# Patient Record
Sex: FEMALE | Race: WHITE | NOT HISPANIC OR LATINO | URBAN - METROPOLITAN AREA
[De-identification: names, ages, dates, MRNs, and addresses within clinical notes are randomized per-mention and may not be internally consistent; named-entity substitution may affect disease eponyms.]

---

## 2024-03-28 ENCOUNTER — OFFICE VISIT (OUTPATIENT)
Dept: OPHTHALMOLOGY | Facility: CLINIC | Age: 69
End: 2024-03-28
Payer: MEDICARE

## 2024-03-28 DIAGNOSIS — Z96.1 PSEUDOPHAKIA OF BOTH EYES: ICD-10-CM

## 2024-03-28 DIAGNOSIS — E11.9 CONTROLLED TYPE 2 DIABETES MELLITUS WITHOUT COMPLICATION, UNSPECIFIED WHETHER LONG TERM INSULIN USE (MULTI): Primary | ICD-10-CM

## 2024-03-28 PROCEDURE — 92014 COMPRE OPH EXAM EST PT 1/>: CPT | Performed by: OPHTHALMOLOGY

## 2024-03-28 RX ORDER — VALACYCLOVIR HYDROCHLORIDE 1 G/1
TABLET, FILM COATED ORAL EVERY 24 HOURS
COMMUNITY
Start: 2023-10-18

## 2024-03-28 RX ORDER — LOSARTAN POTASSIUM 50 MG/1
TABLET ORAL
COMMUNITY
Start: 2006-05-09

## 2024-03-28 RX ORDER — ASPIRIN 81 MG/1
TABLET ORAL
COMMUNITY
Start: 2008-04-08

## 2024-03-28 RX ORDER — LEVOTHYROXINE SODIUM 112 UG/1
TABLET ORAL EVERY 24 HOURS
COMMUNITY

## 2024-03-28 RX ORDER — METFORMIN HYDROCHLORIDE 1000 MG/1
1000 TABLET ORAL 2 TIMES DAILY
COMMUNITY

## 2024-03-28 RX ORDER — FLUOXETINE 10 MG/1
CAPSULE ORAL EVERY 24 HOURS
COMMUNITY
Start: 2017-06-13

## 2024-03-28 RX ORDER — BENZONATATE 100 MG/1
CAPSULE ORAL
COMMUNITY
Start: 2024-02-21

## 2024-03-28 RX ORDER — CEPHALEXIN 500 MG/1
500 CAPSULE ORAL 4 TIMES DAILY
COMMUNITY
Start: 2024-03-13 | End: 2024-03-20

## 2024-03-28 RX ORDER — LOVASTATIN 40 MG/1
TABLET ORAL EVERY 24 HOURS
COMMUNITY
Start: 2014-06-21

## 2024-03-28 RX ORDER — EMPAGLIFLOZIN 10 MG/1
10 TABLET, FILM COATED ORAL DAILY
COMMUNITY

## 2024-03-28 RX ORDER — BLOOD-GLUCOSE METER
EACH MISCELLANEOUS
COMMUNITY

## 2024-03-28 RX ORDER — FAMOTIDINE 20 MG/1
TABLET, FILM COATED ORAL
COMMUNITY
Start: 2007-04-04

## 2024-03-28 ASSESSMENT — REFRACTION_CURRENTRX
OS_SPHERE: +2.50
OS_BASECURVE: 8.6
OS_BRAND: CLARITI 1 DAY
OS_DIAMETER: 14.1

## 2024-03-28 ASSESSMENT — REFRACTION_MANIFEST
OS_AXIS: 145
OS_ADD: +2.50
OD_SPHERE: PLANO
OD_CYLINDER: SPHERE
OS_SPHERE: +0.75
OD_ADD: +2.50
OS_CYLINDER: -1.00

## 2024-03-28 ASSESSMENT — ENCOUNTER SYMPTOMS
MUSCULOSKELETAL NEGATIVE: 0
RESPIRATORY NEGATIVE: 0
ALLERGIC/IMMUNOLOGIC NEGATIVE: 0
CONSTITUTIONAL NEGATIVE: 0
PSYCHIATRIC NEGATIVE: 0
GASTROINTESTINAL NEGATIVE: 0
NEUROLOGICAL NEGATIVE: 0
HEMATOLOGIC/LYMPHATIC NEGATIVE: 0
EYES NEGATIVE: 0
CARDIOVASCULAR NEGATIVE: 0
ENDOCRINE NEGATIVE: 0

## 2024-03-28 ASSESSMENT — CONF VISUAL FIELD
OS_SUPERIOR_NASAL_RESTRICTION: 0
OD_SUPERIOR_TEMPORAL_RESTRICTION: 0
OS_SUPERIOR_TEMPORAL_RESTRICTION: 0
OD_INFERIOR_TEMPORAL_RESTRICTION: 0
OD_SUPERIOR_NASAL_RESTRICTION: 0
OS_INFERIOR_NASAL_RESTRICTION: 0
OS_INFERIOR_TEMPORAL_RESTRICTION: 0
OS_NORMAL: 1
OD_NORMAL: 1
OD_INFERIOR_NASAL_RESTRICTION: 0

## 2024-03-28 ASSESSMENT — TONOMETRY
IOP_METHOD: GOLDMANN APPLANATION
OS_IOP_MMHG: 16
OD_IOP_MMHG: 15

## 2024-03-28 ASSESSMENT — CUP TO DISC RATIO
OD_RATIO: .3
OS_RATIO: .4

## 2024-03-28 ASSESSMENT — VISUAL ACUITY
METHOD: SNELLEN - LINEAR
OS_SC: 20/20
OD_SC: 20/20
OS_SC+: -1

## 2024-03-28 ASSESSMENT — EXTERNAL EXAM - LEFT EYE: OS_EXAM: NORMAL

## 2024-03-28 ASSESSMENT — EXTERNAL EXAM - RIGHT EYE: OD_EXAM: NORMAL

## 2024-03-28 ASSESSMENT — SLIT LAMP EXAM - LIDS
COMMENTS: GOOD POSITION
COMMENTS: GOOD POSITION

## 2024-03-28 NOTE — PROGRESS NOTES
Assessment/Plan   Diagnoses and all orders for this visit:  Controlled type 2 diabetes mellitus without complication, unspecified whether long term insulin use (CMS/[facility])  no retinopathy observed on exam today od/os, pt ed to continue good BGlc, blood pressure and lipid control, rtc with any changes in vision, otherwise monitor 1 year   Pseudophakia of both eyes  CL prescription given to patient today   +2.50 OS for near  Resolved chalazion, rec [facility]

## 2024-08-14 ENCOUNTER — APPOINTMENT (OUTPATIENT)
Dept: OCCUPATIONAL THERAPY | Facility: CLINIC | Age: 69
End: 2024-08-14
Payer: MEDICARE

## 2024-08-21 ENCOUNTER — HOSPITAL ENCOUNTER (OUTPATIENT)
Dept: RADIOLOGY | Facility: CLINIC | Age: 69
Discharge: HOME | End: 2024-08-21
Payer: MEDICARE

## 2024-08-21 ENCOUNTER — EVALUATION (OUTPATIENT)
Dept: PHYSICAL THERAPY | Facility: CLINIC | Age: 69
End: 2024-08-21
Payer: MEDICARE

## 2024-08-21 DIAGNOSIS — M79.601 RIGHT ARM PAIN: Primary | ICD-10-CM

## 2024-08-21 DIAGNOSIS — M54.12 RADICULOPATHY, CERVICAL REGION: ICD-10-CM

## 2024-08-21 PROCEDURE — 72050 X-RAY EXAM NECK SPINE 4/5VWS: CPT

## 2024-08-21 PROCEDURE — 97162 PT EVAL MOD COMPLEX 30 MIN: CPT | Mod: GP

## 2024-08-21 ASSESSMENT — PAIN SCALES - GENERAL
PAINLEVEL_OUTOF10: 7
PAINLEVEL_OUTOF10: 6

## 2024-08-21 ASSESSMENT — ENCOUNTER SYMPTOMS
DEPRESSION: 0
OCCASIONAL FEELINGS OF UNSTEADINESS: 0
LOSS OF SENSATION IN FEET: 0

## 2024-08-21 ASSESSMENT — PATIENT HEALTH QUESTIONNAIRE - PHQ9
SUM OF ALL RESPONSES TO PHQ9 QUESTIONS 1 AND 2: 0
2. FEELING DOWN, DEPRESSED OR HOPELESS: NOT AT ALL
1. LITTLE INTEREST OR PLEASURE IN DOING THINGS: NOT AT ALL

## 2024-08-21 ASSESSMENT — PAIN - FUNCTIONAL ASSESSMENT: PAIN_FUNCTIONAL_ASSESSMENT: 0-10

## 2024-08-21 NOTE — LETTER
August 21, 2024    Goyo Anderson MD  [address] [address]    Patient: Sarahi Arreguin   YOB: 1955   Date of Visit: 8/21/2024       Dear Goyo Anderson MD  [address] [address]    The attached plan of care is being sent to you because your patient’s medical reimbursement requires that you certify the plan of care. Your signature is required to allow uninterrupted insurance coverage.      You may indicate your approval by signing below and faxing this form back to us at Dept Fax: [phone].    Please call Dept: [phone] with any questions or concerns.    Thank you for this referral,        Colleen Magana, PT  POR [address] [facility] 14  [facility]  [address] [address]    Payer: Payor: [employer] MEDICARE / Plan: [employer] / Product Type: *No Product type* /                                                                         Date:     Dear Colleen Magana, PT,     Re: Ms. Sarahi Arreguin, MRN:[id number]    I certify that I have reviewed the attached plan of care and it is medically necessary for Ms. Sarahi Arreguin (1955) who is under my care.          ______________________________________                    _________________  Provider name and credentials                                           Date and time                                                                                           Plan of Care 8/21/24   Effective from: 8/21/2024  Effective to: 11/19/2024    Plan ID: [id number]            Participants as of Finalize on 8/21/2024    Name Type Comments Contact Info    Goyo Anderson MD PCP - General  [phone]       Last Plan Note     Author: Colleen Magana, PT Status: Incomplete Last edited: 8/21/2024  8:00 AM       Physical Therapy    Physical Therapy Evaluation    Patient Name: Sarahi Arreguin  MRN: [id number]  Today's  Date: 8/21/2024    Time Entry:  Time Calculation  Start Time: 0800  Stop Time: 0845  Time Calculation (min): 45 min  PT Evaluation Time Entry  PT Evaluation (Moderate) Time Entry: 45                      Assessment  PT Assessment Results: Decreased strength, Pain, Decreased range of motion  Rehab Prognosis: Good  Barriers to Participation:  ($40 per visit copay)  Assessment Comment: Signs and symptoms are more consistent with possible cervical radiculopathy vs isolated right shoulder and elbow isses as right UE symptoms can be provoked with cervical flexion, cervical retraction, and with quadrant testing. Mild relief of distal symptoms with manual distraction and with postural corrections achieved today; however, her symptoms are easily provoked. We discussed having her review this information with her PCP today and discuss options. We can address her deficits with skilled PT and work from a premise of radiculopathy (cervical derangement, with no clear directional preference determined today) to see if we can help Sarahi achieve consistent symptom relief; ongoing self management and return to PLOF.    Plan  Treatment/Interventions: Education/ Instruction, Hot pack, Manual therapy, Therapeutic activities, Therapeutic exercises  PT Plan: Skilled PT  PT Frequency: 1 time per week  Duration: 6 weeks; pending progress  Onset Date: 07/01/24  Certification Period Start Date: 08/21/24  Certification Period End Date: 11/19/24  Number of Treatments Authorized: No prior auth required  Rehab Potential: Good  Plan of Care Agreement: Patient    Current Problem  1. Right arm pain  Referral to Physical Therapy    Follow Up In Physical Therapy          Subjective  General:  General  Reason for Referral: Right shoulder and elbow pain. (upper trap strain, lateral epicondylitis)  Referred By: Dr Goyo Anderson  Past Medical History Relevant to Rehab: Reviewed in Epic and on on rehab intake form. Remote hx of frozen shoulders  R/L.  General Comment: Reports right side shoulder; arm pit; scapular and forearm pain that started early July. She feels that posibly picking up her grandson who is now 25lbs.  The pain is constant. She will occasionally get tingling down her arm and into her hand. She saw her PCP who prescribed naproxen and PT. She has a follow up with him later this am.  She denies neck pain currently, but has a history of neck pain. She tried naproxen 2 times/ day for 10 days without relief. Tylenol and ibuprofen gives minimal relief. She notes that sitting upright provides some relief of her symptoms.  Precautions:  Precautions  STEADI Fall Risk Score (The score of 4 or more indicates an increased risk of falling): 0  Precautions Comment: None     Pain:  Pain Assessment: 0-10  0-10 (Numeric) Pain Score: 7  Pain Location: Arm  Pain Orientation: Right  Pain Radiating Towards: forearm, hand (tingling at times)  Home Living:  Home Living Comment: Lives with sig other in single story; no stairs.  Has modified some dailly activities to minimize her pain.  Prior Function Per Pt/Caregiver Report:  Vocational: Retired (Retired RN)  Leisure: Painting, reading, watches grandson 1 day/ week  Hand Dominance: Right  Prior Function Comments: Independent, active    Objective  Shoulder      Shoulder palpation/Joint Assessment     Mild tenderness along right medial scap border; right supraspinous fossa.   Baseline symptoms:  Right med scap border; body of scapula and in wrist ext mm belly pain/ soreness  Cervical AROM  Cervical flexion: (80°): min decreased and triggers right UE tingling  Cervical extension: (50°): min decreased, no change in tingling  Cervical Rotation: (80°): 60 deg  Cervical rotation left: (80°): 65 deg  Cervical sidebend right: (45°): 35 and increased scap pain  Cervical sidebend left: (45°): 30 deg    Right UE Symptoms (to scap to wrist) produced with cervical flexion, neck retractions (repeated neck retractions); and with  right quadrant test.   Trial of manual cervical distraction: Right UE symptoms decreased (to above elbow)  with improved posture (less slouching, less forward head), initially decreased with manual cervical distraction but then increased after manual distraction was stopped.     Shoulder AROM  Shoulder AROM WFL: yes    Shoulder Strength   Grossly 4+/5    Bilateral scalene and upper trap tightness right more than left     and Elbow     Elbow Palpation/Joint Mobility Assessment     Tender over right lateral epicondyle and mm belly of wrist extensors  Elbow AROM  Elbow AROM WFL: yes     Elbow Strength   Wrist ext: 4+ R ;  4+ L  Wrist flex: 5 R ; 5 L     Strength   R  strength: 48lb average of 2 trials (no pain at forearm or lateral elbow)  L  strength: 58 lb average of 2 trials    Elbow Special Tests      Negative phalen's  Negative pain with resisted wrist extension    Outcome Measure:  Quick Dash: 38 (raw score)    OP EDUCATION:  Outpatient Education  Individual(s) Educated: Patient  Education Provided: Anatomy, Body Mechanics, POC  Risk and Benefits Discussed with Patient/Caregiver/Other: yes  Patient/Caregiver Demonstrated Understanding: yes  Plan of Care Discussed and Agreed Upon: yes  Patient Response to Education: Patient/Caregiver Verbalized Understanding of Information, Patient/Caregiver Performed Return Demonstration of Exercises/Activities, Patient/Caregiver Asked Appropriate Questions  Discussed possible differential diagnosis of cervical radiculopathy and to discuss this with her PCP; and how we would likely approach this with PT.  Discussed postural modifications to avoid neck flexion which is a trigger for her arm symptoms currently.     Goals:  Patient Goal: Would like pain relief.    Physical Therapy:  Pain: Will be independent with symptom management strategies and report right UE pain centralized to neck/ scapula and no worse than 2/10.  Strength: Right UE strength;  strength equal to  uninvolved side for return to full PLOF with all ADLs, IADLs, recreational pursuits.  Function: QuickDash <= 15 for improved use of UE with all daily activities.  HEP/ Self Management:  Will be independent and compliant with appropriate HEP for carryover of PT for consistent right UE symptom management, posture and body mechanics modification for symptom management and return to PLOF.          Current Participants as of 8/21/2024    Name Type Comments Contact Info    Goyo Anderson MD PCP - General  [phone]    Signature pending

## 2024-08-21 NOTE — PROGRESS NOTES
Physical Therapy    Physical Therapy Evaluation    Patient Name: Sarahi Arreguin  MRN: [id number]  Today's Date: 8/21/2024    Time Entry:  Time Calculation  Start Time: 0800  Stop Time: 0845  Time Calculation (min): 45 min  PT Evaluation Time Entry  PT Evaluation (Moderate) Time Entry: 45                      Assessment  PT Assessment Results: Decreased strength, Pain, Decreased range of motion  Rehab Prognosis: Good  Barriers to Participation:  ($40 per visit copay)  Assessment Comment: Signs and symptoms are more consistent with possible cervical radiculopathy vs isolated right shoulder and elbow isses as right UE symptoms can be provoked with cervical flexion, cervical retraction, and with quadrant testing. Mild relief of distal symptoms with manual distraction and with postural corrections achieved today; however, her symptoms are easily provoked. We discussed having her review this information with her PCP today and discuss options. We can address her deficits with skilled PT and work from a premise of radiculopathy (cervical derangement, with no clear directional preference determined today) to see if we can help Sarahi achieve consistent symptom relief; ongoing self management and return to PLOF.    Plan  Treatment/Interventions: Education/ Instruction, Hot pack, Manual therapy, Therapeutic activities, Therapeutic exercises  PT Plan: Skilled PT  PT Frequency: 1 time per week  Duration: 6 weeks; pending progress  Onset Date: 07/01/24  Certification Period Start Date: 08/21/24  Certification Period End Date: 11/19/24  Number of Treatments Authorized: No prior auth required  Rehab Potential: Good  Plan of Care Agreement: Patient    Current Problem  1. Right arm pain  Referral to Physical Therapy    Follow Up In Physical Therapy          Subjective   General:  General  Reason for Referral: Right shoulder and elbow pain. (upper trap strain, lateral epicondylitis)  Referred By: Dr Goyo Anderson  Past Medical  History Relevant to Rehab: Reviewed in Epic and on on rehab intake form. Remote hx of frozen shoulders R/L.  General Comment: Reports right side shoulder; arm pit; scapular and forearm pain that started early July. She feels that posibly picking up her grandson who is now 25lbs.  The pain is constant. She will occasionally get tingling down her arm and into her hand. She saw her PCP who prescribed naproxen and PT. She has a follow up with him later this am.  She denies neck pain currently, but has a history of neck pain. She tried naproxen 2 times/ day for 10 days without relief. Tylenol and ibuprofen gives minimal relief. She notes that sitting upright provides some relief of her symptoms.  Precautions:  Precautions  STEADI Fall Risk Score (The score of 4 or more indicates an increased risk of falling): 0  Precautions Comment: None     Pain:  Pain Assessment: 0-10  0-10 (Numeric) Pain Score: 7  Pain Location: Arm  Pain Orientation: Right  Pain Radiating Towards: forearm, hand (tingling at times)  Home Living:  Home Living Comment: Lives with sig other in single story; no stairs.  Has modified some dailly activities to minimize her pain.  Prior Function Per Pt/Caregiver Report:  Vocational: Retired (Retired RN)  Leisure: Painting, reading, watches grandson 1 day/ week  Hand Dominance: Right  Prior Function Comments: Independent, active    Objective   Shoulder      Shoulder palpation/Joint Assessment     Mild tenderness along right medial scap border; right supraspinous fossa.   Baseline symptoms:  Right med scap border; body of scapula and in wrist ext mm belly pain/ soreness  Cervical AROM  Cervical flexion: (80°): min decreased and triggers right UE tingling  Cervical extension: (50°): min decreased, no change in tingling  Cervical Rotation: (80°): 60 deg  Cervical rotation left: (80°): 65 deg  Cervical sidebend right: (45°): 35 and increased scap pain  Cervical sidebend left: (45°): 30 deg    Right UE Symptoms  (to scap to wrist) produced with cervical flexion, neck retractions (repeated neck retractions); and with right quadrant test.   Trial of manual cervical distraction: Right UE symptoms decreased (to above elbow)  with improved posture (less slouching, less forward head), initially decreased with manual cervical distraction but then increased after manual distraction was stopped.     Shoulder AROM  Shoulder AROM WFL: yes    Shoulder Strength   Grossly 4+/5    Bilateral scalene and upper trap tightness right more than left     and Elbow     Elbow Palpation/Joint Mobility Assessment     Tender over right lateral epicondyle and mm belly of wrist extensors  Elbow AROM  Elbow AROM WFL: yes     Elbow Strength   Wrist ext: 4+ R ;  4+ L  Wrist flex: 5 R ; 5 L     Strength   R  strength: 48lb average of 2 trials (no pain at forearm or lateral elbow)  L  strength: 58 lb average of 2 trials    Elbow Special Tests      Negative phalen's  Negative pain with resisted wrist extension    Outcome Measure:  Quick Dash: 38 (raw score)    OP EDUCATION:  Outpatient Education  Individual(s) Educated: Patient  Education Provided: Anatomy, Body Mechanics, POC  Risk and Benefits Discussed with Patient/Caregiver/Other: yes  Patient/Caregiver Demonstrated Understanding: yes  Plan of Care Discussed and Agreed Upon: yes  Patient Response to Education: Patient/Caregiver Verbalized Understanding of Information, Patient/Caregiver Performed Return Demonstration of Exercises/Activities, Patient/Caregiver Asked Appropriate Questions  Discussed possible differential diagnosis of cervical radiculopathy and to discuss this with her PCP; and how we would likely approach this with PT.  Discussed postural modifications to avoid neck flexion which is a trigger for her arm symptoms currently.     Goals:  Patient Goal: Would like pain relief.    Physical Therapy:  Pain: Will be independent with symptom management strategies and report right UE pain  centralized to neck/ scapula and no worse than 2/10.  Strength: Right UE strength;  strength equal to uninvolved side for return to full PLOF with all ADLs, IADLs, recreational pursuits.  Function: QuickDash <= 15 for improved use of UE with all daily activities.  HEP/ Self Management:  Will be independent and compliant with appropriate HEP for carryover of PT for consistent right UE symptom management, posture and body mechanics modification for symptom management and return to PLOF.

## 2024-08-23 ENCOUNTER — APPOINTMENT (OUTPATIENT)
Facility: CLINIC | Age: 69
End: 2024-08-23
Payer: MEDICARE

## 2024-08-30 ENCOUNTER — TREATMENT (OUTPATIENT)
Dept: PHYSICAL THERAPY | Facility: CLINIC | Age: 69
End: 2024-08-30
Payer: MEDICARE

## 2024-08-30 DIAGNOSIS — M79.601 RIGHT ARM PAIN: ICD-10-CM

## 2024-08-30 PROCEDURE — 97530 THERAPEUTIC ACTIVITIES: CPT | Mod: GP

## 2024-08-30 PROCEDURE — 97110 THERAPEUTIC EXERCISES: CPT | Mod: GP

## 2024-08-30 PROCEDURE — 97140 MANUAL THERAPY 1/> REGIONS: CPT | Mod: GP

## 2024-08-30 ASSESSMENT — PAIN - FUNCTIONAL ASSESSMENT: PAIN_FUNCTIONAL_ASSESSMENT: 0-10

## 2024-08-30 ASSESSMENT — PAIN SCALES - GENERAL
PAINLEVEL_OUTOF10: 4
PAINLEVEL_OUTOF10: 4

## 2024-08-30 NOTE — PROGRESS NOTES
Physical Therapy    Physical Therapy Treatment    Patient Name: Sarahi Arreguin  MRN: [id number]  Today's Date: 8/30/2024    Time Entry:   Time Calculation  Start Time: 0805  Stop Time: 0855  Time Calculation (min): 50 min     PT Therapeutic Procedures Time Entry  Manual Therapy Time Entry: 12  Therapeutic Exercise Time Entry: 8  Therapeutic Activity Time Entry: 20              Non-Billable Time  Non-billable time: 10  Non-billable time reason: reviewed xrays and Dr follow up since first visit    Assessment:   Right UE syptoms have subsided since being on a steroid taper. Had an xray which does show degenerative changes in c spine and will see a spine specialist and is awaiting an MRI. Tolerated manual cervical distraction today without provocation of UE symptoms. Cervical flexion and neck retraction still trigger faint right forearm symptoms.  We discussed and focused a lot on postural awareness and modfications with all daily activities to eliminate possible triggers of her right UE symptoms.     Plan:   Assess response to today's treatment. Continue cervical traction if indicated and progress postural exercises.     Current Problem  1. Right arm pain  Follow Up In Physical Therapy          General     General  General Comment: Having some tingling into right arm still, and into right shoulder blade as well as some mild neck discomfort. The symptoms are intermittent now and since beginning a steroid taper almost 10 days ago. taking motrin/ tylenol as well. Neck flexion will still trigger her symptoms. Had a neck xray which does show degenerative changes at C5. Has been referred to a spine specialist and an MRI has been requested.    Subjective    Precautions  Precautions  STEADI Fall Risk Score (The score of 4 or more indicates an increased risk of falling): 0  Precautions Comment: None    Pain  Pain Assessment  Pain Assessment: 0-10  0-10 (Numeric) Pain Score: 4  Pain Location: Arm  Pain Orientation:  "Right  Pain Radiating Towards: forearm  Pain 2  Pain Score 2: 4  Pain Location 2: Shoulder  Pain Orientation 2: Right  Pain Radiating Towards 2: scapula    Objective   Treatments:  Reviewed recent xrays.    There Activity:  20 min  Baseline symptoms today- mild sensitivity of right forearm  Chin tuck increases the right forearm tingling  Cervical flexion and extension increase tingling  Mild axillary discomfort with median nerve ULTT  Neg radial and ulnar ULTT  Reviewed and discussed body mechanics modifications to avoid neck flexion, neck protraction forward head, neck ext with all daily activities including reading, painting/ drawing, walking.   Practiced more upright posture.    There Ex: 8 min  Retro shoulder rolls  Scap retractions  \"No money with yellow bands\" x 20  Reviewed and updated HEP    Manual therapy:  12 minutes of intermittent cervical distraction in neutral spine positions      Access Code: [email or web address]  URL: [email or web address]  Date: 08/30/2024  Prepared by: Colleen Magana    Exercises  - Standing Backward Shoulder Rolls  - 2 x daily - 7 x weekly - 20 reps  - Shoulder External Rotation and Scapular Retraction with Resistance  - 2 x daily - 7 x weekly - 20 reps    Goals:  Patient Goal: Would like pain relief.    Physical Therapy:  Pain: Will be independent with symptom management strategies and report right UE pain centralized to neck/ scapula and no worse than 2/10.  Strength: Right UE strength;  strength equal to uninvolved side for return to full PLOF with all ADLs, IADLs, recreational pursuits.  Function: QuickDash <= 15 for improved use of UE with all daily activities.  HEP/ Self Management:  Will be independent and compliant with appropriate HEP for carryover of PT for consistent right UE symptom management, posture and body mechanics modification for symptom management and return to PLOF.   "

## 2024-09-04 ENCOUNTER — HOSPITAL ENCOUNTER (OUTPATIENT)
Dept: RADIOLOGY | Facility: CLINIC | Age: 69
Discharge: HOME | End: 2024-09-04
Payer: MEDICARE

## 2024-09-04 VITALS — WEIGHT: 139 LBS | HEIGHT: 64 IN | BODY MASS INDEX: 23.73 KG/M2

## 2024-09-04 DIAGNOSIS — Z12.31 ENCOUNTER FOR SCREENING MAMMOGRAM FOR MALIGNANT NEOPLASM OF BREAST: ICD-10-CM

## 2024-09-04 PROCEDURE — 77063 BREAST TOMOSYNTHESIS BI: CPT | Performed by: RADIOLOGY

## 2024-09-04 PROCEDURE — 77067 SCR MAMMO BI INCL CAD: CPT | Performed by: RADIOLOGY

## 2024-09-04 PROCEDURE — 77067 SCR MAMMO BI INCL CAD: CPT

## 2024-09-06 ENCOUNTER — TREATMENT (OUTPATIENT)
Dept: PHYSICAL THERAPY | Facility: CLINIC | Age: 69
End: 2024-09-06
Payer: MEDICARE

## 2024-09-06 DIAGNOSIS — M79.601 RIGHT ARM PAIN: ICD-10-CM

## 2024-09-06 PROCEDURE — 97012 MECHANICAL TRACTION THERAPY: CPT | Mod: GP

## 2024-09-06 PROCEDURE — 97110 THERAPEUTIC EXERCISES: CPT | Mod: GP

## 2024-09-06 ASSESSMENT — PAIN - FUNCTIONAL ASSESSMENT: PAIN_FUNCTIONAL_ASSESSMENT: 0-10

## 2024-09-06 ASSESSMENT — PAIN SCALES - GENERAL: PAINLEVEL_OUTOF10: 3

## 2024-09-06 NOTE — PROGRESS NOTES
"Physical Therapy    Physical Therapy Treatment    Patient Name: Sarahi Arreguin  MRN: [id number]  Today's Date: 9/6/2024    Time Entry:   Time Calculation  Start Time: 0803  Stop Time: 0845  Time Calculation (min): 42 min     PT Therapeutic Procedures Time Entry  Therapeutic Exercise Time Entry: 23  PT Modalities Time Entry  Mechanical Traction Time Entry: 15                Assessment:   Decreased intensity of right UE symptoms; able to decrease tingling when she does get it by changing her head position. Tolerated first session of mechanical cervical traction well. No   Plan:    Assess response to mechanical traction and additional postural strengthening ex.     Current Problem  1. Right arm pain  Follow Up In Physical Therapy          General     General  General Comment: Completed her steroid dose pack this past Sunday. [place] like her symptoms were better on them; but she is still noticing less right UE tingling because she is watching her head position. If her right UE tingles, she can reduce it with changing her head position (staying in a neutral position). Still having mostly forearm dull ache, intermittent tingling.    Subjective    Precautions  Precautions  STEADI Fall Risk Score (The score of 4 or more indicates an increased risk of falling): 0  Precautions Comment: none    Pain  Pain Assessment  Pain Assessment: 0-10  0-10 (Numeric) Pain Score: 3  Pain Location: Arm  Pain Orientation: Right  Pain Radiating Towards: forearm, top of shoulder    Objective   Treatments:  Reviewed and discussed body mechanics modifications to avoid neck flexion, neck protraction forward head, neck ext with all daily activities including reading, painting/ drawing, walking.   Practiced more upright posture.    There Ex: 23 min  Mid rows- green bands  x 20  Bilat shoulder ext (pulldowns)- green bands x 20  \"No money with yellow bands\" x 20  Shoulder opposite diagonal x 10 R/L yellow  Gentle doorway stretch  3 x 20 sec  Reviewed " and updated HEP    Mechanical Cervical Traction x 15 min  Intermittent:  40 sec hold/ 10 sec rest;   15# max/ 10# min x 15 total    Access Code: [email or web address]  URL: [email or web address]  Date: 09/06/2024  Prepared by: Colleen Magana    Exercises  - Standing Bilateral Low Shoulder Row with Anchored Resistance  - 1 x daily - 3 x weekly - 2-3 sets - 10 reps  - Shoulder Extension with Resistance  - 1 x daily - 3 x weekly - 2-3 sets - 10 reps  - Standing Shoulder Diagonal Horizontal Abduction 60/120 Degrees with Resistance  - 1 x daily - 3 x weekly - 2-3 sets - 10 reps  Goals:  Patient Goal: Would like pain relief.    Physical Therapy:  Pain: Will be independent with symptom management strategies and report right UE pain centralized to neck/ scapula and no worse than 2/10.  Strength: Right UE strength;  strength equal to uninvolved side for return to full PLOF with all ADLs, IADLs, recreational pursuits.  Function: QuickDash <= 15 for improved use of UE with all daily activities.  HEP/ Self Management:  Will be independent and compliant with appropriate HEP for carryover of PT for consistent right UE symptom management, posture and body mechanics modification for symptom management and return to PLOF.

## 2024-09-11 ENCOUNTER — TREATMENT (OUTPATIENT)
Dept: PHYSICAL THERAPY | Facility: CLINIC | Age: 69
End: 2024-09-11
Payer: MEDICARE

## 2024-09-11 DIAGNOSIS — M79.601 RIGHT ARM PAIN: ICD-10-CM

## 2024-09-11 PROCEDURE — 97110 THERAPEUTIC EXERCISES: CPT | Mod: GP

## 2024-09-11 ASSESSMENT — PAIN SCALES - GENERAL
PAINLEVEL_OUTOF10: 5 - MODERATE PAIN
PAINLEVEL_OUTOF10: 5 - MODERATE PAIN

## 2024-09-11 ASSESSMENT — PAIN - FUNCTIONAL ASSESSMENT: PAIN_FUNCTIONAL_ASSESSMENT: 0-10

## 2024-09-11 NOTE — PROGRESS NOTES
Physical Therapy    Physical Therapy Treatment    Patient Name: Sarahi Arreguin  MRN: [id number]  Today's Date: 9/11/2024    Time Entry:   Time Calculation  Start Time: 1020  Stop Time: 1100  Time Calculation (min): 40 min     PT Therapeutic Procedures Time Entry  Therapeutic Exercise Time Entry: 38                   Assessment:    Sarahi has attended 4 PT sessions to address right UE pain/ cervical radiculopathy. Mechanical traction felt good while on it last time, but she experienced increased right UE symptoms for a good day after. Attempts at manual distraction produced forearm symptoms today. She is not getting any significant relief of her symptoms at this time; but can somewhat control the intensity of symptoms with avoiding certain neck positions (right lat flexion and rotation; and cervical flexion). She was denied an MRI by her insurance until she completes 6 sessions of PT. The initial relief from the recent medrol dosepac, may be diminishing,. We did add nerve flossing today to see if that will help further control her right UE symptoms.     Plan:   Assess response to nerve flossing; postural ex. Recheck next visit.    Current Problem  1. Right arm pain  Follow Up In Physical Therapy          General     General  General Comment: Right arm is hurting almost always and she is having hypersensitivity. Top of right shoulder blade is hurting pretty constantly. Was pretty sore after last session. Unsure if traction helped last time; but feels it didn't necessarily hurt.   Has been trying to be more consistent wtih medication.; and may have 2-3 hours of relief at most. Completed medrol dose pac about two weeks ago; and symptoms have slowly increased.    Subjective    Precautions  Precautions  STEADI Fall Risk Score (The score of 4 or more indicates an increased risk of falling): 0  Precautions Comment: none  Vital Signs     Pain  Pain Assessment  Pain Assessment: 0-10  0-10 (Numeric) Pain Score: 5 -  "Moderate pain  Pain Location: Arm  Pain Orientation: Right  Pain Radiating Towards: forearm  Pain 2  Pain Score 2: 5 - Moderate pain  Pain Location 2: Shoulder  Pain Orientation 2: Right  Pain Radiating Towards 2: scapula    Objective    Right cervical rotation; lat flexion and forward flexion still provoke right arm symptoms.   Right  : 40# (position 2 );   Left : 45#  Positive ULTT radial and ulnar nerve. (Radial n more reactive than ulnar)    Treatments:  There ex 38 min:   Reviewed and discussed body mechanics modifications to avoid neck flexion, neck protraction forward head, neck ext with all daily activities including reading, painting/ drawing, walking.   Practiced more upright posture.    Recheck movement testing; ULTT  Cervical retractions: 10 x 5 sec (limited range); provoked right forearm symptoms, so stopped   Mid rows- green bands  x 20  Bilat shoulder ext (pulldowns)- green bands x 20  \"No money with yellow bands\" x 20  Shoulder opposite diagonal x 10 R/L yellow- hold today  Gentle doorway stretch  3 x 20 sec  Radial nerve flossing performed in standing and head to neutral on return x 20 reps  Reviewed and updated HEP    Manual cervical distraction:  not tolerated today due to increased distal arm tingling    Access Code: [email or web address]  URL: [email or web address]  Date: 09/11/2024  Prepared by: Colleen Magana    Exercises  - Radial Nerve Flossing (Mirrored)  - 2 x daily - 7 x weekly - 20 reps    Goals:  Patient Goal: Would like pain relief.    Physical Therapy:  Pain: Will be independent with symptom management strategies and report right UE pain centralized to neck/ scapula and no worse than 2/10.  Strength: Right UE strength;  strength equal to uninvolved side for return to full PLOF with all ADLs, IADLs, recreational pursuits.  Function: QuickDash <= 15 for improved use of UE with all daily activities.  HEP/ Self Management:  Will be independent and compliant with " appropriate HEP for carryover of PT for consistent right UE symptom management, posture and body mechanics modification for symptom management and return to PLOF.      1

## 2024-09-20 ENCOUNTER — TREATMENT (OUTPATIENT)
Dept: PHYSICAL THERAPY | Facility: CLINIC | Age: 69
End: 2024-09-20
Payer: MEDICARE

## 2024-09-20 DIAGNOSIS — M79.601 RIGHT ARM PAIN: ICD-10-CM

## 2024-09-20 PROCEDURE — 97140 MANUAL THERAPY 1/> REGIONS: CPT | Mod: GP

## 2024-09-20 PROCEDURE — 97530 THERAPEUTIC ACTIVITIES: CPT | Mod: GP

## 2024-09-20 ASSESSMENT — PAIN - FUNCTIONAL ASSESSMENT: PAIN_FUNCTIONAL_ASSESSMENT: 0-10

## 2024-09-20 ASSESSMENT — PAIN SCALES - GENERAL
PAINLEVEL_OUTOF10: 3
PAINLEVEL_OUTOF10: 1

## 2024-09-20 NOTE — PROGRESS NOTES
Physical Therapy    Physical Therapy Treatment    Patient Name: Sarahi Arreguin  MRN: [id number]  Today's Date: 9/20/2024    Time Entry:   Time Calculation  Start Time: 1120  Stop Time: 1200  Time Calculation (min): 40 min     PT Therapeutic Procedures Time Entry  Manual Therapy Time Entry: 10  Therapeutic Activity Time Entry: 15              Non-Billable Time  Non-billable time: 10  Non-billable time reason: Moist heat to neck    Assessment:    Limited response to and tolerance for any interventions today due to more easily provoked right UE radicular symptoms. Since being off the steroid dose pack; her right UE symptoms are more easily provoked, and in general seem to be worsening. Sarahi will see neurologist Monday and get further recommendations for next steps in diagnosis / management. We have one more scheduled outpatient PT visit next week unless her neurologist visit changes that.     Plan:   Will recheck next week during last scheduled PT visit and await further information from Sarahi's scheduled neurology visit.    Current Problem  1. Right arm pain  Follow Up In Physical Therapy          General     General  General Comment: Symptoms are no better; and maybe a little worse. Arm is tingling as soon as she looks down and slightly with right sidebending.  The tingling goes down to her hand at times. The new nerve glide doesn't aggravate, but doesn't necessarily give relief. If she keeps her head in a neutral position this seems to control her arm symptoms. Is limiting many activities due to her pain. Still has a sensation of her arm being cold. Not sleeping well. is calling in a script for Gabapenting and Sarahi was able to get an appt with neurologist for this coming Monday.    Subjective    Precautions  Precautions  STEADI Fall Risk Score (The score of 4 or more indicates an increased risk of falling): 0  Precautions Comment: none    Pain  Pain Assessment  Pain Assessment: 0-10  0-10 (Numeric)  Pain Score: 3 (7/10 at worst in the past few days)  Pain Location: Arm  Pain Orientation: Right  Pain Radiating Towards: forearm, hand  Pain 2  Pain Score 2: 1  Pain Location 2: Shoulder  Pain Orientation 2: Right  Pain Radiating Towards 2: scapula/ top of shoulder blade    Objective   Right UE symptoms produced with neck flexion, cervical retraction, and right lat flexion more acutely now. And to forearm/ hand.    Still with right ULTT pos with radial n bias primarily.  Limited ex and therapy tolerance due to easily provoked right UE symptoms.     Treatments:    Reviewed and discussed body mechanics modifications to avoid neck flexion, neck protraction forward head, neck ext with all daily activities including reading, painting/ drawing, walking.   Practiced more upright posture.    Rechecked movement testing; ULTT    Moist Heat x 10 min prior to manual techniques.   Manual cervical distraction:  3 x 20 sec holds; not tolerated today due to increased distal arm tingling  Manual cervical left side glides: x 8 min , inermittent; decreased right UE symptoms but not fully abolished.     Reviewed current HEP; did not perform today as patient can perform these at home.     Goals:  Patient Goal: Would like pain relief.    Physical Therapy:  Pain: Will be independent with symptom management strategies and report right UE pain centralized to neck/ scapula and no worse than 2/10.  Strength: Right UE strength;  strength equal to uninvolved side for return to full PLOF with all ADLs, IADLs, recreational pursuits.  Function: QuickDash <= 15 for improved use of UE with all daily activities.  HEP/ Self Management:  Will be independent and compliant with appropriate HEP for carryover of PT for consistent right UE symptom management, posture and body mechanics modification for symptom management and return to PLOF.

## 2024-09-25 ENCOUNTER — TREATMENT (OUTPATIENT)
Dept: PHYSICAL THERAPY | Facility: CLINIC | Age: 69
End: 2024-09-25
Payer: MEDICARE

## 2024-09-25 DIAGNOSIS — M79.601 RIGHT ARM PAIN: ICD-10-CM

## 2024-09-25 PROCEDURE — 97110 THERAPEUTIC EXERCISES: CPT | Mod: GP

## 2024-09-25 PROCEDURE — 97530 THERAPEUTIC ACTIVITIES: CPT | Mod: GP

## 2024-09-25 PROCEDURE — 97140 MANUAL THERAPY 1/> REGIONS: CPT | Mod: GP

## 2024-09-25 ASSESSMENT — PAIN - FUNCTIONAL ASSESSMENT: PAIN_FUNCTIONAL_ASSESSMENT: 0-10

## 2024-09-25 ASSESSMENT — PAIN SCALES - GENERAL
PAINLEVEL_OUTOF10: 5 - MODERATE PAIN
PAINLEVEL_OUTOF10: 7

## 2024-09-25 NOTE — PROGRESS NOTES
Physical Therapy    Physical Therapy Treatment/ Recheck    Patient Name: Sarahi Arreguin  MRN: [id number]  Today's Date: 9/25/2024    Time Entry:   Time Calculation  Start Time: 1540  Stop Time: 1620  Time Calculation (min): 40 min     PT Therapeutic Procedures Time Entry  Therapeutic Activity Time Entry: 30              Non-Billable Time  Non-billable time: 10  Non-billable time reason: Moist heat     PT Therapeutic Procedures Time Entry  Therapeutic Activity Time Entry: 30              Non-Billable Time  Non-billable time: 10  Non-billable time reason: Moist heat    Assessment:    Limited response to and tolerance forPT interventions. Right UE radicular symptoms are more easily provoked and appear to be more consistent. Sarahi was seen by a neurologist and it has been recommended that she have an MRI for additional workup. She will followup with neurology after the MRI. Sarahi's right UE symptoms are still produced with cervical flexion, extension, repeated neck retractions and she has a positive quadrant test on the right. She is working to incorporate postural modifications into her daily activities to minimize her symptoms while still trying to stay active; and she is independent with a home program for postural mm strengthening. See objective data and goal status below.     Plan:   Due to poor response to PT, and further workup indicated by her visit with neurologist; we will hold futher PT at this time.  If we do not here from Sarahi to resume PT in the next month; we will formally discharge.     Current Problem  1. Right arm pain  Follow Up In Physical Therapy          General     General  General Comment: Having a bad day today; arm is sore. Has been taking Gabapentin x 2 weeks and hasn't noticed improvement yet. Has still been trying to do what she can instead of doing nothing. Watching her posture/ positioning with this. Saw the neurologist Monday and she feels there is definitely a nerve being  pinched and an MRI is indicated due to the poor response to PT.    Subjective    Precautions  Precautions  STEADI Fall Risk Score (The score of 4 or more indicates an increased risk of falling): 0  Precautions Comment: none    Pain  Pain Assessment  Pain Assessment: 0-10  0-10 (Numeric) Pain Score: 7  Pain Location: Arm  Pain Orientation: Right  Pain Radiating Towards: forearm, elbow  Pain 2  Pain Score 2: 5 - Moderate pain  Pain Location 2: Shoulder  Pain Orientation 2: Right    Objective     Cervical AROM  Cervical flexion: (80°): 40 deg; mod decreased and triggers right UE tingling  Cervical extension: (50°): 45 deg provokes mild tingling in upper arm  Cervical Rotation: (80°): 80 deg  Cervical rotation left: (80°): 75 deg  Cervical sidebend right: (45°): 40 and increased scap pain, UE pain  Cervical sidebend left: (45°): 33 deg, pulls on right in neck     Repeated neck retractions:  5+ reps ; produces pain in right arm pain and radial side of forearm    UE myotomes:  4/5 right shoulder flexion;  abduction  4+/5 biceps bilaterally  Wrist ext: 4+ bilaterally  Wrist flex: 4- right;  4+/5 left    Outcome Measures:  Other Measures  Neck Disability Index: 27    Right UE symptoms produced with neck flexion, cervical retraction, and right lat flexion more acutely now. And to forearm/ hand.    Still with right ULTT pos with radial n bias primarily.  Limited ex and therapy tolerance due to easily provoked right UE symptoms.     Treatments:  Moist Heat x 10 min prior to movement testing/ recheck    Reviewed and discussed body mechanics modifications to avoid neck flexion, neck protraction forward head, neck ext with all daily activities including reading, painting/ drawing, walking.   Practiced more upright posture.    Rechecked movement testing; ULTT, strength- see objective data and goal status.    Reviewed current HEP; did not perform today as patient can perform these at home. ; and pt deferred due to increased pain.      Goals: as of recheck on 9/25/24  Patient Goal: Would like pain relief.- not met, symptoms have slowly worsened since ending steroid taper.    Physical Therapy:  Pain: Will be independent with symptom management strategies and report right UE pain centralized to neck/ scapula and no worse than 2/10.- not met  Strength: Right UE strength;  strength equal to uninvolved side for return to full PLOF with all ADLs, IADLs, recreational pursuits.  Function: QuickDash <= 15 for improved use of UE with all daily activities.- , NDI scored today for baseline measures.  HEP/ Self Management:  Will be independent and compliant with appropriate HEP for carryover of PT for consistent right UE symptom management, posture and body mechanics modification for symptom management and return to PLOF. - partially met

## 2024-10-30 ENCOUNTER — HOSPITAL ENCOUNTER (OUTPATIENT)
Dept: RADIOLOGY | Facility: CLINIC | Age: 69
Discharge: HOME | End: 2024-10-30
Payer: MEDICARE

## 2024-10-30 DIAGNOSIS — M54.12 RADICULOPATHY, CERVICAL REGION: ICD-10-CM

## 2024-10-30 DIAGNOSIS — M48.02 SPINAL STENOSIS, CERVICAL REGION: ICD-10-CM

## 2024-10-30 PROCEDURE — 72141 MRI NECK SPINE W/O DYE: CPT

## 2024-10-30 PROCEDURE — 72141 MRI NECK SPINE W/O DYE: CPT | Performed by: RADIOLOGY

## 2024-11-12 ENCOUNTER — OFFICE VISIT (OUTPATIENT)
Dept: URGENT CARE | Age: 69
End: 2024-11-12
Payer: MEDICARE

## 2024-11-12 VITALS
TEMPERATURE: 98.7 F | HEART RATE: 83 BPM | OXYGEN SATURATION: 97 % | SYSTOLIC BLOOD PRESSURE: 142 MMHG | WEIGHT: 140 LBS | DIASTOLIC BLOOD PRESSURE: 88 MMHG | BODY MASS INDEX: 24.03 KG/M2

## 2024-11-12 DIAGNOSIS — J01.90 ACUTE SINUSITIS, RECURRENCE NOT SPECIFIED, UNSPECIFIED LOCATION: Primary | ICD-10-CM

## 2024-11-12 RX ORDER — METHYLPREDNISOLONE 4 MG/1
TABLET ORAL
Qty: 21 TABLET | Refills: 0 | Status: SHIPPED | OUTPATIENT
Start: 2024-11-12 | End: 2024-11-19

## 2024-11-12 RX ORDER — DOXYCYCLINE 100 MG/1
100 CAPSULE ORAL 2 TIMES DAILY
Qty: 14 CAPSULE | Refills: 0 | Status: SHIPPED | OUTPATIENT
Start: 2024-11-12 | End: 2024-11-19

## 2024-11-12 RX ORDER — GABAPENTIN 100 MG/1
100 CAPSULE ORAL 3 TIMES DAILY
COMMUNITY

## 2024-11-12 ASSESSMENT — ENCOUNTER SYMPTOMS
COUGH: 1
CARDIOVASCULAR NEGATIVE: 1
CONSTITUTIONAL NEGATIVE: 1
RHINORRHEA: 1
SHORTNESS OF BREATH: 0

## 2024-11-12 NOTE — PATIENT INSTRUCTIONS
Consider taking Mucinex for congestion. Make sure to drink plenty of fluids while taking this medication as it increases its effectiveness.     Consider Zyrtec or Claritin    Consider Flonase Nasal Spray    Consider taking Sudafed to help decrease any congestion. If no history of high blood pressure.  Consider Corcedin BP for high blood pressure.    Use throat lozenges, buckwheat honey, gargling salt water to help relieve sore throat symptoms.     Recommend inhaling steam from a hot shower or hot bath as well as using saline sinus rinse for congestion. Recommend Anders Med sinus rinse. Make sure to use bottled or distilled water.

## 2024-11-12 NOTE — PROGRESS NOTES
Subjective   Patient ID: Sarahi Arreguin is a 69 y.o. female. They present today with a chief complaint of Cough and Fever.    History of Present Illness  69-year-old female presents to clinic today with complaints of cough and congestion.  Patient states that this has been ongoing for slightly over 2 weeks.  She states that she has had stuffy runny nose throughout that time.  She is blowing yellow mucus.  She does have a sore throat as well.  She states it has worsened recently.  Her grandson that she has been watching did just test positive for RSV.  She denies any known fever.  Denies body aches or chills.  Denies chest pain shortness of breath.      Cough  Associated symptoms include rhinorrhea. Pertinent negatives include no shortness of breath.   Fever   Associated symptoms include congestion and coughing.       Past Medical History  Allergies as of 11/12/2024 - Reviewed 11/12/2024   Allergen Reaction Noted    Benzonatate Hallucinations 03/28/2024    Codeine Other 03/28/2024    Metformin hcl Unknown 03/28/2024       (Not in a hospital admission)       Past Medical History:   Diagnosis Date    Personal history of other endocrine, nutritional and metabolic disease     History of diabetes mellitus    Personal history of other endocrine, nutritional and metabolic disease     History of high cholesterol    Personal history of other endocrine, nutritional and metabolic disease     History of thyroid disorder    Personal history of other infectious and parasitic diseases     History of herpes zoster    Personal history of other mental and behavioral disorders     History of depression       Past Surgical History:   Procedure Laterality Date    ADENOIDECTOMY  08/14/2017    Adenoidectomy    OTHER SURGICAL HISTORY  08/14/2017    Extracaps Cataract Extract With Prosthesis Insert Left Eye    OTHER SURGICAL HISTORY  08/14/2017    Extracaps Cataract Extract With Prosthesis Insert Right Eye    OTHER SURGICAL HISTORY   08/05/2021    Tubal ligation    OTHER SURGICAL HISTORY  10/24/2019    Shoulder surgery    TONSILLECTOMY  08/14/2017    Tonsillectomy        reports that she has never smoked. She does not have any smokeless tobacco history on file. Alcohol use questions deferred to the physician. Drug use questions deferred to the physician.    Review of Systems  Review of Systems   Constitutional: Negative.    HENT:  Positive for congestion and rhinorrhea.    Respiratory:  Positive for cough. Negative for shortness of breath.    Cardiovascular: Negative.                                   Objective    Vitals:    11/12/24 1002   BP: 142/88   Pulse: 83   Temp: 37.1 °C (98.7 °F)   SpO2: 97%   Weight: 63.5 kg (140 lb)     No LMP recorded. Patient is postmenopausal.    Physical Exam  Constitutional:       General: She is not in acute distress.     Appearance: Normal appearance.   HENT:      Mouth/Throat:      Mouth: Mucous membranes are moist.      Pharynx: No oropharyngeal exudate or posterior oropharyngeal erythema.   Cardiovascular:      Rate and Rhythm: Normal rate and regular rhythm.      Heart sounds: Normal heart sounds.   Pulmonary:      Effort: Pulmonary effort is normal.      Breath sounds: Normal breath sounds.   Neurological:      Mental Status: She is alert.         Procedures    Point of Care Test & Imaging Results from this visit  No results found for this visit on 11/12/24.   No results found.    Diagnostic study results (if any) were reviewed by Alexander Ellsworth PA-C.    Assessment/Plan   Allergies, medications, history, and pertinent labs/EKGs/Imaging reviewed by Alexander Ellsworth PA-C.     Medical Decision Making  Cardiopulmonary exam within normal limits.  Vital signs are stable.  No other acute abnormality noted on physical examination.  I did start patient on doxycycline for acute sinusitis.  We discussed Augmentin however she prefers not to use it due to GI problems.  I am not overly concerned about RSV.  There is no  noted fever.  No other viral-like symptoms.  I did send in Medrol Dosepak and for her as well.  She has tolerated this in the past.  She has a appointment with her neurologist in regards to her neck tomorrow and I advised her to hold off on taking it until she sees them.  She is in agreement with plan.  Patient in no acute distress, alert and oriented upon discharge.    Orders and Diagnoses  There are no diagnoses linked to this encounter.    Medical Admin Record      Patient disposition: Home    Electronically signed by Alexander Ellsworht PA-C  10:06 AM

## 2024-11-25 ENCOUNTER — LAB (OUTPATIENT)
Dept: LAB | Facility: LAB | Age: 69
End: 2024-11-25
Payer: MEDICARE

## 2024-11-25 DIAGNOSIS — E55.9 VITAMIN D DEFICIENCY, UNSPECIFIED: ICD-10-CM

## 2024-11-25 DIAGNOSIS — E03.9 HYPOTHYROIDISM, UNSPECIFIED: ICD-10-CM

## 2024-11-25 DIAGNOSIS — R53.83 OTHER FATIGUE: ICD-10-CM

## 2024-11-25 DIAGNOSIS — E78.2 MIXED HYPERLIPIDEMIA: ICD-10-CM

## 2024-11-25 DIAGNOSIS — E11.65 TYPE 2 DIABETES MELLITUS WITH HYPERGLYCEMIA (MULTI): Primary | ICD-10-CM

## 2024-11-25 LAB
25(OH)D3 SERPL-MCNC: 25 NG/ML (ref 30–100)
ALBUMIN SERPL BCP-MCNC: 4.6 G/DL (ref 3.4–5)
ALP SERPL-CCNC: 56 U/L (ref 33–136)
ALT SERPL W P-5'-P-CCNC: 16 U/L (ref 7–45)
ANION GAP SERPL CALC-SCNC: 12 MMOL/L (ref 10–20)
AST SERPL W P-5'-P-CCNC: 17 U/L (ref 9–39)
BILIRUB SERPL-MCNC: 0.6 MG/DL (ref 0–1.2)
BUN SERPL-MCNC: 13 MG/DL (ref 6–23)
CALCIUM SERPL-MCNC: 9.3 MG/DL (ref 8.6–10.3)
CHLORIDE SERPL-SCNC: 100 MMOL/L (ref 98–107)
CHOLEST SERPL-MCNC: 150 MG/DL (ref 0–199)
CHOLESTEROL/HDL RATIO: 3.2
CO2 SERPL-SCNC: 27 MMOL/L (ref 21–32)
CREAT SERPL-MCNC: 0.66 MG/DL (ref 0.5–1.05)
EGFRCR SERPLBLD CKD-EPI 2021: >90 ML/MIN/1.73M*2
ERYTHROCYTE [DISTWIDTH] IN BLOOD BY AUTOMATED COUNT: 11.9 % (ref 11.5–14.5)
GLUCOSE SERPL-MCNC: 134 MG/DL (ref 74–99)
HCT VFR BLD AUTO: 41 % (ref 36–46)
HDLC SERPL-MCNC: 46.2 MG/DL
HGB BLD-MCNC: 13.8 G/DL (ref 12–16)
LDLC SERPL CALC-MCNC: 70 MG/DL
MCH RBC QN AUTO: 31 PG (ref 26–34)
MCHC RBC AUTO-ENTMCNC: 33.7 G/DL (ref 32–36)
MCV RBC AUTO: 92 FL (ref 80–100)
NON HDL CHOLESTEROL: 104 MG/DL (ref 0–149)
NRBC BLD-RTO: 0 /100 WBCS (ref 0–0)
PLATELET # BLD AUTO: 184 X10*3/UL (ref 150–450)
POTASSIUM SERPL-SCNC: 4.2 MMOL/L (ref 3.5–5.3)
PROT SERPL-MCNC: 6.9 G/DL (ref 6.4–8.2)
RBC # BLD AUTO: 4.45 X10*6/UL (ref 4–5.2)
SODIUM SERPL-SCNC: 135 MMOL/L (ref 136–145)
TRIGL SERPL-MCNC: 168 MG/DL (ref 0–149)
TSH SERPL-ACNC: 0.75 MIU/L (ref 0.44–3.98)
VLDL: 34 MG/DL (ref 0–40)
WBC # BLD AUTO: 4.6 X10*3/UL (ref 4.4–11.3)

## 2024-11-25 PROCEDURE — 82306 VITAMIN D 25 HYDROXY: CPT

## 2024-11-25 PROCEDURE — 36415 COLL VENOUS BLD VENIPUNCTURE: CPT

## 2024-11-25 PROCEDURE — 80061 LIPID PANEL: CPT

## 2024-11-25 PROCEDURE — 84443 ASSAY THYROID STIM HORMONE: CPT

## 2024-11-25 PROCEDURE — 85027 COMPLETE CBC AUTOMATED: CPT

## 2024-11-25 PROCEDURE — 80053 COMPREHEN METABOLIC PANEL: CPT

## 2025-02-11 ENCOUNTER — OFFICE VISIT (OUTPATIENT)
Dept: URGENT CARE | Age: 70
End: 2025-02-11
Payer: MEDICARE

## 2025-02-11 VITALS
HEART RATE: 76 BPM | DIASTOLIC BLOOD PRESSURE: 85 MMHG | TEMPERATURE: 98.2 F | SYSTOLIC BLOOD PRESSURE: 155 MMHG | BODY MASS INDEX: 23.69 KG/M2 | OXYGEN SATURATION: 97 % | WEIGHT: 138 LBS

## 2025-02-11 DIAGNOSIS — J02.9 SORE THROAT: ICD-10-CM

## 2025-02-11 DIAGNOSIS — Z20.822 SUSPECTED 2019-NCOV INFECTION: ICD-10-CM

## 2025-02-11 LAB
POC BINAX EXPIRATION: 0
POC BINAX NOW COVID SERIAL NUMBER: 0
POC RAPID INFLUENZA A: NEGATIVE
POC RAPID INFLUENZA B: NEGATIVE
POC RAPID STREP: NEGATIVE
POC SARS-COV-2 AG BINAX: NORMAL

## 2025-02-11 ASSESSMENT — ENCOUNTER SYMPTOMS: COUGH: 1

## 2025-02-11 NOTE — PROGRESS NOTES
Subjective   Patient ID: Sarahi Arreguin is a 69 y.o. female. They present today with a chief complaint of Cough (Pt states x2days cough, fatigue and sore throat. Says painful swallowing ).    History of Present Illness    Cough        Past Medical History  Allergies as of 02/11/2025 - Reviewed 02/11/2025   Allergen Reaction Noted    Benzonatate Hallucinations 03/28/2024    Codeine Other 03/28/2024    Metformin hcl Unknown 03/28/2024       (Not in a hospital admission)       Past Medical History:   Diagnosis Date    Personal history of other endocrine, nutritional and metabolic disease     History of diabetes mellitus    Personal history of other endocrine, nutritional and metabolic disease     History of high cholesterol    Personal history of other endocrine, nutritional and metabolic disease     History of thyroid disorder    Personal history of other infectious and parasitic diseases     History of herpes zoster    Personal history of other mental and behavioral disorders     History of depression       Past Surgical History:   Procedure Laterality Date    ADENOIDECTOMY  08/14/2017    Adenoidectomy    OTHER SURGICAL HISTORY  08/14/2017    Extracaps Cataract Extract With Prosthesis Insert Left Eye    OTHER SURGICAL HISTORY  08/14/2017    Extracaps Cataract Extract With Prosthesis Insert Right Eye    OTHER SURGICAL HISTORY  08/05/2021    Tubal ligation    OTHER SURGICAL HISTORY  10/24/2019    Shoulder surgery    TONSILLECTOMY  08/14/2017    Tonsillectomy        reports that she has never smoked. She does not have any smokeless tobacco history on file. Alcohol use questions deferred to the physician. Drug use questions deferred to the physician.    Review of Systems  Review of Systems   Respiratory:  Positive for cough.                                   Objective    Vitals:    02/11/25 1600   BP: 155/85   Pulse: 76   Temp: 36.8 °C (98.2 °F)   SpO2: 97%   Weight: 62.6 kg (138 lb)     No LMP recorded. Patient is  postmenopausal.    Physical Exam  Vitals reviewed.   HENT:      Head: Normocephalic and atraumatic.      Right Ear: Tympanic membrane and ear canal normal. No tenderness.      Left Ear: Tympanic membrane and ear canal normal. No tenderness.      Nose: Congestion present. No rhinorrhea.      Mouth/Throat:      Mouth: Mucous membranes are moist.      Pharynx: Oropharynx is clear. Uvula midline. No pharyngeal swelling or posterior oropharyngeal erythema.   Eyes:      Extraocular Movements: Extraocular movements intact.      Conjunctiva/sclera: Conjunctivae normal.      Pupils: Pupils are equal, round, and reactive to light.   Cardiovascular:      Rate and Rhythm: Normal rate and regular rhythm.      Heart sounds: No murmur heard.  Pulmonary:      Effort: Pulmonary effort is normal.      Breath sounds: Normal breath sounds. No decreased breath sounds, wheezing, rhonchi or rales.   Skin:     General: Skin is warm.   Neurological:      Mental Status: She is alert and oriented to person, place, and time.   Psychiatric:         Mood and Affect: Mood normal.         Behavior: Behavior normal.         Procedures    Point of Care Test & Imaging Results from this visit  Results for orders placed or performed in visit on 02/11/25   POCT Covid-19 Rapid Antigen   Result Value Ref Range    Binax NOW Covid Serial Number 0     BINAX NOW Covid Expiration 0     POC MO-COV-2 AG  Presumptive negative test for SARS-CoV-2 (no antigen detected)     Presumptive negative test for SARS-CoV-2 (no antigen detected)   POCT rapid strep A manually resulted   Result Value Ref Range    POC Rapid Strep Negative Negative   POCT Influenza A/B manually resulted   Result Value Ref Range    POC Rapid Influenza A Negative Negative    POC Rapid Influenza B Negative Negative      No results found.    Diagnostic study results (if any) were reviewed by Raul Man PA-C.    Assessment/Plan   Allergies, medications, history, and pertinent labs/EKGs/Imaging  reviewed by Raul Man PA-C.     Medical Decision Making  Negative COVID, flu, strep tests.  Advised patient to take over-the-counter medication for cold and flu as needed for her symptoms.  -         Patient is educated about their diagnoses.     -          Discussed medications benefits and adverse effects.     -          Answered all patient’s questions.     -          Patient will call 911 or go to the nearest ED if worsen symptoms .     -          Patient is agreeable to the plan of care and is deemed stable upon discharge.     -          Follow up with your primary care provider in two days.    Orders and Diagnoses  Diagnoses and all orders for this visit:  Sore throat  -     POCT rapid strep A manually resulted  Suspected 2019-nCoV infection  -     POCT Covid-19 Rapid Antigen  -     POCT Influenza A/B manually resulted      Medical Admin Record      Patient disposition: Home    Electronically signed by Raul Man PA-C  5:10 PM

## 2025-03-14 ENCOUNTER — ANCILLARY PROCEDURE (OUTPATIENT)
Dept: URGENT CARE | Age: 70
End: 2025-03-14
Payer: MEDICARE

## 2025-03-14 ENCOUNTER — OFFICE VISIT (OUTPATIENT)
Dept: URGENT CARE | Age: 70
End: 2025-03-14
Payer: MEDICARE

## 2025-03-14 VITALS
WEIGHT: 141 LBS | DIASTOLIC BLOOD PRESSURE: 75 MMHG | TEMPERATURE: 98.2 F | HEART RATE: 74 BPM | RESPIRATION RATE: 15 BRPM | BODY MASS INDEX: 24.2 KG/M2 | OXYGEN SATURATION: 95 % | SYSTOLIC BLOOD PRESSURE: 125 MMHG

## 2025-03-14 DIAGNOSIS — J15.7 PNEUMONIA DUE TO MYCOPLASMA PNEUMONIAE, UNSPECIFIED LATERALITY, UNSPECIFIED PART OF LUNG: Primary | ICD-10-CM

## 2025-03-14 DIAGNOSIS — R09.89 CHEST CONGESTION: ICD-10-CM

## 2025-03-14 LAB
POC RAPID INFLUENZA A: NEGATIVE
POC RAPID INFLUENZA B: NEGATIVE
POC RAPID STREP: NEGATIVE
POC SARS-COV-2 AG BINAX: NORMAL

## 2025-03-14 PROCEDURE — 71046 X-RAY EXAM CHEST 2 VIEWS: CPT | Performed by: NURSE PRACTITIONER

## 2025-03-14 RX ORDER — IPRATROPIUM BROMIDE AND ALBUTEROL SULFATE 2.5; .5 MG/3ML; MG/3ML
3 SOLUTION RESPIRATORY (INHALATION) ONCE
Status: COMPLETED | OUTPATIENT
Start: 2025-03-14 | End: 2025-03-14

## 2025-03-14 RX ORDER — AZITHROMYCIN 250 MG/1
TABLET, FILM COATED ORAL
Qty: 6 TABLET | Refills: 0 | Status: SHIPPED | OUTPATIENT
Start: 2025-03-14 | End: 2025-03-19

## 2025-03-14 RX ORDER — CODEINE PHOSPHATE AND GUAIFENESIN 10; 100 MG/5ML; MG/5ML
10 SOLUTION ORAL EVERY 6 HOURS PRN
Qty: 120 ML | Refills: 0 | Status: SHIPPED | OUTPATIENT
Start: 2025-03-14 | End: 2025-03-17

## 2025-03-14 RX ORDER — ALBUTEROL SULFATE 90 UG/1
2 INHALANT RESPIRATORY (INHALATION) EVERY 6 HOURS PRN
Qty: 18 G | Refills: 0 | Status: SHIPPED | OUTPATIENT
Start: 2025-03-14 | End: 2026-03-14

## 2025-03-14 RX ADMIN — IPRATROPIUM BROMIDE AND ALBUTEROL SULFATE 3 ML: 2.5; .5 SOLUTION RESPIRATORY (INHALATION) at 16:56

## 2025-03-14 ASSESSMENT — ENCOUNTER SYMPTOMS
VOMITING: 0
MYALGIAS: 1
HEADACHES: 0
PALPITATIONS: 0
PHOTOPHOBIA: 0
SORE THROAT: 0
FATIGUE: 1
SHORTNESS OF BREATH: 1
ABDOMINAL PAIN: 0
NAUSEA: 0
DIZZINESS: 0
APPETITE CHANGE: 0
CHEST TIGHTNESS: 1
RHINORRHEA: 0
DIARRHEA: 1
ARTHRALGIAS: 0
WHEEZING: 1
ACTIVITY CHANGE: 1
COUGH: 1
SINUS PAIN: 1
SINUS PRESSURE: 1

## 2025-03-14 ASSESSMENT — PATIENT HEALTH QUESTIONNAIRE - PHQ9
2. FEELING DOWN, DEPRESSED OR HOPELESS: NOT AT ALL
1. LITTLE INTEREST OR PLEASURE IN DOING THINGS: NOT AT ALL
SUM OF ALL RESPONSES TO PHQ9 QUESTIONS 1 AND 2: 0

## 2025-03-14 NOTE — PATIENT INSTRUCTIONS
Thank you for letting me care for you today.  You have been seen today for pneumonia.   You have been prescribed a cough medication with codeine today.  Please take as prescribed.  This medication can be sedating.  Please only take it at night if it causes any sleepiness.  Do not take no additional sedating medications/substances such as pain medications, muscle relaxers, Benadryl, NyQuil, alcohol, marijuana.  Please do not operate heavy machinery while taking this medication.  Please follow up with your primary care provider/pediatrician as directed.   If one is needed, please call [phone].  Please seek care in emergency room for red flags as discussed during visit.

## 2025-03-14 NOTE — PROGRESS NOTES
Subjective   Patient ID: Sarahi Arreguin is a 69 y.o. female. They present today with a chief complaint of URI (Cough, runny nose, ST and diarrhea x 1 -2 days).    History of Present Illness  This is a 69-year-old female with a medical history of, but limited to, type 2 diabetes here today for evaluation of acute onset URI symptoms.  Patient states she was here at the end of February with similar symptoms.  She felt better for about a week and then they returned worse 2 days ago.  Biggest complaint is her cough.  She reports cough is harsh and is keeping her up at night.  Feels like she has chest congestion.  Reports fatigue.  No shortness of breath with coughing fits.  No shortness of breath at rest, with exertion, orthopnea, pedal edema.  Patient states she has mild diarrhea but thinks because she had to restart metformin recently.  Denies additional GI complaints.  Has recently been around family members who are treated for mycoplasma pneumonia.      URI  Presenting symptoms: congestion, cough and fatigue    Presenting symptoms: no ear pain, no rhinorrhea and no sore throat    Associated symptoms: myalgias, sinus pain and wheezing    Associated symptoms: no arthralgias and no headaches        Past Medical History  Allergies as of 03/14/2025 - Reviewed 03/14/2025   Allergen Reaction Noted    Benzonatate Hallucinations 03/28/2024    Codeine Other 03/28/2024    Metformin hcl Unknown 03/28/2024       (Not in a hospital admission)       Past Medical History:   Diagnosis Date    Personal history of other endocrine, nutritional and metabolic disease     History of diabetes mellitus    Personal history of other endocrine, nutritional and metabolic disease     History of high cholesterol    Personal history of other endocrine, nutritional and metabolic disease     History of thyroid disorder    Personal history of other infectious and parasitic diseases     History of herpes zoster    Personal history of other mental  and behavioral disorders     History of depression       Past Surgical History:   Procedure Laterality Date    ADENOIDECTOMY  08/14/2017    Adenoidectomy    OTHER SURGICAL HISTORY  08/14/2017    Extracaps Cataract Extract With Prosthesis Insert Left Eye    OTHER SURGICAL HISTORY  08/14/2017    Extracaps Cataract Extract With Prosthesis Insert Right Eye    OTHER SURGICAL HISTORY  08/05/2021    Tubal ligation    OTHER SURGICAL HISTORY  10/24/2019    Shoulder surgery    TONSILLECTOMY  08/14/2017    Tonsillectomy        reports that she has never smoked. She has never been exposed to tobacco smoke. She has never used smokeless tobacco. Alcohol use questions deferred to the physician. Drug use questions deferred to the physician.    Review of Systems  Review of Systems   Constitutional:  Positive for activity change and fatigue. Negative for appetite change.   HENT:  Positive for congestion, sinus pressure and sinus pain. Negative for ear pain, rhinorrhea and sore throat.    Eyes:  Negative for photophobia and visual disturbance.   Respiratory:  Positive for cough, chest tightness, shortness of breath and wheezing.    Cardiovascular:  Negative for chest pain, palpitations and leg swelling.   Gastrointestinal:  Positive for diarrhea. Negative for abdominal pain, nausea and vomiting.   Musculoskeletal:  Positive for myalgias. Negative for arthralgias.   Skin:  Negative for rash.   Neurological:  Negative for dizziness and headaches.   All other systems reviewed and are negative.                                 Objective    Vitals:    03/14/25 1613   BP: 125/75   Pulse: 74   Resp: 15   Temp: 36.8 °C (98.2 °F)   TempSrc: Oral   SpO2: 95%   Weight: 64 kg (141 lb)     No LMP recorded. Patient is postmenopausal.    Physical Exam  Vitals reviewed.   Constitutional:       Appearance: Normal appearance. She is normal weight. She is ill-appearing.   HENT:      Head: Normocephalic and atraumatic.      Right Ear: Tympanic membrane,  ear canal and external ear normal.      Left Ear: Tympanic membrane, ear canal and external ear normal.      Nose: Nose normal.      Mouth/Throat:      Mouth: Mucous membranes are moist.      Pharynx: Oropharynx is clear.   Eyes:      Extraocular Movements: Extraocular movements intact.      Conjunctiva/sclera: Conjunctivae normal.      Pupils: Pupils are equal, round, and reactive to light.   Cardiovascular:      Rate and Rhythm: Normal rate and regular rhythm.      Pulses: Normal pulses.      Heart sounds: Normal heart sounds.   Pulmonary:      Effort: Pulmonary effort is normal.      Breath sounds: Decreased air movement present. Decreased breath sounds present.   Musculoskeletal:         General: Normal range of motion.      Cervical back: Normal range of motion and neck supple.   Skin:     General: Skin is warm and dry.      Capillary Refill: Capillary refill takes less than 2 seconds.   Neurological:      General: No focal deficit present.      Mental Status: She is alert and oriented to person, place, and time.         Procedures    Point of Care Test & Imaging Results from this visit  Results for orders placed or performed in visit on 03/14/25   POCT rapid strep A manually resulted   Result Value Ref Range    POC Rapid Strep Negative Negative   POCT Influenza A/B manually resulted   Result Value Ref Range    POC Rapid Influenza A Negative Negative    POC Rapid Influenza B Negative Negative   POCT Covid-19 Rapid Antigen   Result Value Ref Range    POC MO-COV-2 AG  Presumptive negative test for SARS-CoV-2 (no antigen detected)     Presumptive negative test for SARS-CoV-2 (no antigen detected)      XR chest 2 views    Result Date: 3/14/2025  Interpreted By:  Artem Navas, STUDY: XR CHEST 2 VIEWS   INDICATION: Signs/Symptoms:chest congestion.   COMPARISON: May 4, 2021   ACCESSION NUMBER(S): [id number]   ORDERING CLINICIAN: KRISTIN SCHOENLEIN   FINDINGS: No consolidation, effusion, edema, pneumothorax. Heart  size within normal limits. Atherosclerotic aorta.       No evidence of acute intrathoracic abnormality.   Signed by: Artem Navas 3/14/2025 5:11 PM Dictation workstation:   [id number]     Diagnostic study results (if any) were reviewed by Kristin L Schoenlein, APRN-CNP.    Assessment/Plan   Allergies, medications, history, and pertinent labs/EKGs/Imaging reviewed by Kristin L Schoenlein, APRN-CNP.     Medical Decision Making  VSS, NAD, patient acutely ill-appearing, however, nontoxic in no acute distress.  In office testing negative, no need for additional point-of-care testing.  Physical exam as documented above.  DuoNeb administered to patient.  Patient reports no real change in her symptoms.  Focal right middle lobe crackles with egophony appreciated.  A two-view chest x-ray was performed, I do not appreciate opacity or infiltrate at this time.  Images have been reviewed by myself and radiology as documented above. Results have been discussed with patient/guardian at this time.      Infiltrate likely too early to be seen on x-ray, patient is clinically positive for mycoplasma pneumonia due to exposure, symptoms, and exam.  Placed patient on azithromycin and albuterol HFA.  Short prescription of Cheratussin given to patient.  PDMP reviewed.    I have a low suspicion for any acute pathologies requiring emergent evaluation and further workup at this time.  I believe patient is safe to discharge home with a low threshold for emergency room as discussed during visit.  We discussed close follow-up with primary care provider/pediatrician. Supportive care discussed.  Medication(s) profile of OTC and Rxed medication(s) if prescribed was (were) reviewed.  All questions answered and addressed.  Patient verbalized understanding.      Orders and Diagnoses  Diagnoses and all orders for this visit:  Pneumonia due to Mycoplasma pneumoniae, unspecified laterality, unspecified part of lung  -     XR chest 2 views  -      azithromycin (Zithromax) 250 mg tablet; Take 2 tablets (500 mg) by mouth once daily for 1 day, THEN 1 tablet (250 mg) once daily for 4 days.  -     albuterol 90 mcg/actuation inhaler; Inhale 2 puffs every 6 hours if needed for wheezing.  -     codeine-guaifenesin (Robitussin-AC)  mg/5 mL syrup; Take 10 mL by mouth every 6 hours if needed for cough for up to 3 days.  Chest congestion  -     POCT rapid strep A manually resulted  -     POCT Influenza A/B manually resulted  -     POCT Covid-19 Rapid Antigen  -     XR chest 2 views  -     ipratropium-albuteroL (Duo-Neb) 0.5-2.5 mg/3 mL nebulizer solution 3 mL      Medical Admin Record  Administrations This Visit       ipratropium-albuteroL (Duo-Neb) 0.5-2.5 mg/3 mL nebulizer solution 3 mL       Admin Date  03/14/2025 Action  Given Dose  3 mL Route  nebulization Documented By  Lucita Inman MA                    Patient disposition: Home    Electronically signed by Kristin L Schoenlein, APRN-AMOS  7:35 PM

## 2025-03-25 ENCOUNTER — OFFICE VISIT (OUTPATIENT)
Dept: URGENT CARE | Age: 70
End: 2025-03-25
Payer: MEDICARE

## 2025-03-25 VITALS
TEMPERATURE: 98 F | RESPIRATION RATE: 18 BRPM | SYSTOLIC BLOOD PRESSURE: 132 MMHG | DIASTOLIC BLOOD PRESSURE: 80 MMHG | HEIGHT: 64 IN | WEIGHT: 139 LBS | OXYGEN SATURATION: 96 % | BODY MASS INDEX: 23.73 KG/M2 | HEART RATE: 80 BPM

## 2025-03-25 DIAGNOSIS — J18.9 WALKING PNEUMONIA: Primary | ICD-10-CM

## 2025-03-25 DIAGNOSIS — R53.83 OTHER FATIGUE: ICD-10-CM

## 2025-03-25 DIAGNOSIS — R05.1 ACUTE COUGH: ICD-10-CM

## 2025-03-25 PROCEDURE — 94640 AIRWAY INHALATION TREATMENT: CPT | Performed by: NURSE PRACTITIONER

## 2025-03-25 PROCEDURE — 1159F MED LIST DOCD IN RCRD: CPT | Performed by: NURSE PRACTITIONER

## 2025-03-25 PROCEDURE — 99213 OFFICE O/P EST LOW 20 MIN: CPT | Performed by: NURSE PRACTITIONER

## 2025-03-25 PROCEDURE — 3008F BODY MASS INDEX DOCD: CPT | Performed by: NURSE PRACTITIONER

## 2025-03-25 PROCEDURE — 1036F TOBACCO NON-USER: CPT | Performed by: NURSE PRACTITIONER

## 2025-03-25 RX ORDER — METHYLPREDNISOLONE 4 MG/1
TABLET ORAL
Qty: 21 TABLET | Refills: 0 | Status: SHIPPED | OUTPATIENT
Start: 2025-03-25 | End: 2025-03-31

## 2025-03-25 RX ORDER — IPRATROPIUM BROMIDE AND ALBUTEROL SULFATE 2.5; .5 MG/3ML; MG/3ML
3 SOLUTION RESPIRATORY (INHALATION) ONCE
Status: COMPLETED | OUTPATIENT
Start: 2025-03-25 | End: 2025-03-25

## 2025-03-25 RX ORDER — BROMPHENIRAMINE MALEATE, PSEUDOEPHEDRINE HYDROCHLORIDE, AND DEXTROMETHORPHAN HYDROBROMIDE 2; 30; 10 MG/5ML; MG/5ML; MG/5ML
5 SYRUP ORAL 4 TIMES DAILY PRN
Qty: 120 ML | Refills: 0 | Status: SHIPPED | OUTPATIENT
Start: 2025-03-25 | End: 2025-04-04

## 2025-03-25 RX ORDER — CEFDINIR 300 MG/1
300 CAPSULE ORAL 2 TIMES DAILY
Qty: 20 CAPSULE | Refills: 0 | Status: SHIPPED | OUTPATIENT
Start: 2025-03-25 | End: 2025-04-04

## 2025-03-25 RX ADMIN — IPRATROPIUM BROMIDE AND ALBUTEROL SULFATE 3 ML: 2.5; .5 SOLUTION RESPIRATORY (INHALATION) at 14:55

## 2025-03-25 ASSESSMENT — ENCOUNTER SYMPTOMS
RHINORRHEA: 1
SHORTNESS OF BREATH: 0
OCCASIONAL FEELINGS OF UNSTEADINESS: 0
SINUS PAIN: 0
WHEEZING: 0
CHILLS: 0
ACTIVITY CHANGE: 1
EYE PAIN: 0
APPETITE CHANGE: 0
SORE THROAT: 1
LOSS OF SENSATION IN FEET: 0
EYE DISCHARGE: 0
ABDOMINAL PAIN: 0
COUGH: 1
HEADACHES: 1
MYALGIAS: 0
FATIGUE: 1
TROUBLE SWALLOWING: 0
VOICE CHANGE: 0
DEPRESSION: 0
FACIAL SWELLING: 0
CHEST TIGHTNESS: 0
SINUS PRESSURE: 0
DIZZINESS: 0
FEVER: 0

## 2025-03-25 ASSESSMENT — PATIENT HEALTH QUESTIONNAIRE - PHQ9
SUM OF ALL RESPONSES TO PHQ9 QUESTIONS 1 AND 2: 0
1. LITTLE INTEREST OR PLEASURE IN DOING THINGS: NOT AT ALL
2. FEELING DOWN, DEPRESSED OR HOPELESS: NOT AT ALL

## 2025-03-25 NOTE — PROGRESS NOTES
Subjective   Patient ID: Sarahi Arreguin is a 70 y.o. female. They present today with a chief complaint of lethargic (C/O low energy, cough, drainage, diarrhea and a headache. Pt was here on 3/14 for same sx. ).    History of Present Illness    History provided by:  Patient    Patient presents today with a cough, lethargy, drainage, headache, and diarrhea.  Patient was treated on 314 for walking pneumonia with a Z-Cristóbal, albuterol inhaler, and cough syrup.  Patient's chest x-ray at that time was negative.  Patient states initially the Z-Cristóbal may have worked a little bit but since then her symptoms have been gradually increasing.  Patient has chronic diarrhea from her metformin.  Patient denies fever and night sweats.  Patient is able to do mild activity around the house but is noting that she needs to nap quite frequently which is not her baseline.    Past Medical History  Allergies as of 03/25/2025 - Reviewed 03/25/2025   Allergen Reaction Noted    Benzonatate Hallucinations 03/28/2024    Codeine Other 03/28/2024    Metformin hcl Unknown 03/28/2024       (Not in a hospital admission)       Past Medical History:   Diagnosis Date    Personal history of other endocrine, nutritional and metabolic disease     History of diabetes mellitus    Personal history of other endocrine, nutritional and metabolic disease     History of high cholesterol    Personal history of other endocrine, nutritional and metabolic disease     History of thyroid disorder    Personal history of other infectious and parasitic diseases     History of herpes zoster    Personal history of other mental and behavioral disorders     History of depression       Past Surgical History:   Procedure Laterality Date    ADENOIDECTOMY  08/14/2017    Adenoidectomy    OTHER SURGICAL HISTORY  08/14/2017    Extracaps Cataract Extract With Prosthesis Insert Left Eye    OTHER SURGICAL HISTORY  08/14/2017    Extracaps Cataract Extract With Prosthesis Insert Right Eye  "   OTHER SURGICAL HISTORY  08/05/2021    Tubal ligation    OTHER SURGICAL HISTORY  10/24/2019    Shoulder surgery    TONSILLECTOMY  08/14/2017    Tonsillectomy        reports that she has never smoked. She has never been exposed to tobacco smoke. She has never used smokeless tobacco. Alcohol use questions deferred to the physician. Drug use questions deferred to the physician.    Review of Systems  Review of Systems   Constitutional:  Positive for activity change and fatigue. Negative for appetite change, chills and fever.   HENT:  Positive for congestion, postnasal drip, rhinorrhea and sore throat. Negative for ear pain, facial swelling, mouth sores, sinus pressure, sinus pain, trouble swallowing and voice change.    Eyes:  Negative for pain and discharge.   Respiratory:  Positive for cough. Negative for chest tightness, shortness of breath and wheezing.    Cardiovascular:  Negative for chest pain.   Gastrointestinal:  Negative for abdominal pain.   Musculoskeletal:  Negative for myalgias.   Neurological:  Positive for headaches. Negative for dizziness and syncope.           Objective    Vitals:    03/25/25 1433   BP: 132/80   BP Location: Left arm   Patient Position: Sitting   BP Cuff Size: Adult   Pulse: 80   Resp: 18   Temp: 36.7 °C (98 °F)   TempSrc: Oral   SpO2: 96%   Weight: 63 kg (139 lb)   Height: 1.626 m (5' 4\")     No LMP recorded. Patient is postmenopausal.    Physical Exam  Vitals reviewed.   Constitutional:       General: She is not in acute distress.     Appearance: Normal appearance.   HENT:      Right Ear: Tympanic membrane normal.      Left Ear: Tympanic membrane normal.      Nose: Congestion and rhinorrhea present.      Mouth/Throat:      Pharynx: No oropharyngeal exudate or posterior oropharyngeal erythema.   Eyes:      Conjunctiva/sclera: Conjunctivae normal.   Cardiovascular:      Rate and Rhythm: Normal rate and regular rhythm.   Pulmonary:      Effort: Pulmonary effort is normal.      Breath " sounds: Normal breath sounds.   Skin:     General: Skin is warm and dry.   Neurological:      General: No focal deficit present.      Mental Status: She is alert.         Procedures    Point of Care Test & Imaging Results from this visit  No results found for this visit on 03/25/25.   No results found.    Diagnostic study results (if any) were reviewed by VIJAY Coe.    Assessment/Plan   Allergies, medications, history, and pertinent labs/EKGs/Imaging reviewed by VIJAY Coe.     Medical Decision Making  MDM- Patient presents with signs and symptoms consistent with suspected walking pneumonia. Previous xray negative for acute process, tx with azith which did not improve her symptoms much. No evidence of sepsis or acute respiratory distress at this time. Will treat with appropriate antibiotics for age group/risk factors and current mycoplasma outbreak in area. Patient is advised if symptoms change or worsen go to ED for further evaluation and care. Otherwise follow-up with family doctor for recheck within 5-7 days. Patient verbalized understanding and agrees with plan.      Orders and Diagnoses  Diagnoses and all orders for this visit:  Walking pneumonia  -     methylPREDNISolone (Medrol Dospak) 4 mg tablets; Follow schedule on package instructions  -     cefdinir (Omnicef) 300 mg capsule; Take 1 capsule (300 mg) by mouth 2 times a day for 10 days.  -     ipratropium-albuteroL (Duo-Neb) 0.5-2.5 mg/3 mL nebulizer solution 3 mL  -     brompheniramine-pseudoeph-DM 2-30-10 mg/5 mL syrup; Take 5 mL by mouth 4 times a day as needed for allergies, congestion or cough for up to 10 days.  Acute cough  -     brompheniramine-pseudoeph-DM 2-30-10 mg/5 mL syrup; Take 5 mL by mouth 4 times a day as needed for allergies, congestion or cough for up to 10 days.  Other fatigue      Medical Admin Record  Administrations This Visit       ipratropium-albuteroL (Duo-Neb) 0.5-2.5 mg/3 mL nebulizer  solution 3 mL       Admin Date  03/25/2025 Action  Given Dose  3 mL Route  nebulization Documented By  Davey Mascorro MA                    Patient disposition: Home    Electronically signed by VIJAY Coe  3:12 PM

## 2025-04-03 ENCOUNTER — APPOINTMENT (OUTPATIENT)
Dept: OPHTHALMOLOGY | Facility: CLINIC | Age: 70
End: 2025-04-03
Payer: MEDICARE

## 2025-04-03 DIAGNOSIS — Z96.1 PSEUDOPHAKIA OF BOTH EYES: ICD-10-CM

## 2025-04-03 DIAGNOSIS — E11.9 CONTROLLED TYPE 2 DIABETES MELLITUS WITHOUT COMPLICATION, UNSPECIFIED WHETHER LONG TERM INSULIN USE: Primary | ICD-10-CM

## 2025-04-03 PROCEDURE — 92014 COMPRE OPH EXAM EST PT 1/>: CPT | Performed by: OPHTHALMOLOGY

## 2025-04-03 ASSESSMENT — REFRACTION_MANIFEST
OD_ADD: +2.75
OS_ADD: +2.75
OS_SPHERE: -0.25
OS_AXIS: 138
OD_CYLINDER: SPHERE
OD_SPHERE: -0.25
OS_CYLINDER: -0.50

## 2025-04-03 ASSESSMENT — ENCOUNTER SYMPTOMS
HEMATOLOGIC/LYMPHATIC NEGATIVE: 0
RESPIRATORY NEGATIVE: 0
GASTROINTESTINAL NEGATIVE: 0
MUSCULOSKELETAL NEGATIVE: 0
ENDOCRINE NEGATIVE: 0
NEUROLOGICAL NEGATIVE: 0
EYES NEGATIVE: 1
ALLERGIC/IMMUNOLOGIC NEGATIVE: 0
CARDIOVASCULAR NEGATIVE: 0
PSYCHIATRIC NEGATIVE: 0
CONSTITUTIONAL NEGATIVE: 0

## 2025-04-03 ASSESSMENT — TONOMETRY
OD_IOP_MMHG: 14
OS_IOP_MMHG: 16
IOP_METHOD: GOLDMANN APPLANATION

## 2025-04-03 ASSESSMENT — CUP TO DISC RATIO
OD_RATIO: .3
OS_RATIO: .4

## 2025-04-03 ASSESSMENT — VISUAL ACUITY
METHOD: SNELLEN - LINEAR
OS_SC: 20/30
OD_SC: 20/20-2

## 2025-04-03 ASSESSMENT — REFRACTION_CURRENTRX
OS_DIAMETER: 14.1
OS_SPHERE: +2.50
OS_BASECURVE: 8.6
OS_BRAND: CLARITI 1 DAY

## 2025-04-03 ASSESSMENT — CONF VISUAL FIELD
OD_INFERIOR_NASAL_RESTRICTION: 0
OD_NORMAL: 1
OD_SUPERIOR_TEMPORAL_RESTRICTION: 0
OD_INFERIOR_TEMPORAL_RESTRICTION: 0
OD_SUPERIOR_NASAL_RESTRICTION: 0

## 2025-04-03 ASSESSMENT — EXTERNAL EXAM - LEFT EYE: OS_EXAM: NORMAL

## 2025-04-03 ASSESSMENT — SLIT LAMP EXAM - LIDS
COMMENTS: GOOD POSITION
COMMENTS: GOOD POSITION

## 2025-04-03 ASSESSMENT — EXTERNAL EXAM - RIGHT EYE: OD_EXAM: NORMAL

## 2025-04-03 NOTE — PROGRESS NOTES
Assessment/Plan   Diagnoses and all orders for this visit:  Controlled type 2 diabetes mellitus without complication, unspecified whether long term insulin use  no retinopathy observed on exam today od/os, pt ed to continue good BGlc, blood pressure and lipid control, rtc with any changes in vision, otherwise monitor 1 year   Pseudophakia of both eyes  CL prescription given to patient today   +2.50 OS for near

## 2026-04-06 ENCOUNTER — APPOINTMENT (OUTPATIENT)
Dept: OPHTHALMOLOGY | Facility: CLINIC | Age: 71
End: 2026-04-06
Payer: MEDICARE